# Patient Record
Sex: MALE | Race: WHITE | NOT HISPANIC OR LATINO | URBAN - NONMETROPOLITAN AREA
[De-identification: names, ages, dates, MRNs, and addresses within clinical notes are randomized per-mention and may not be internally consistent; named-entity substitution may affect disease eponyms.]

---

## 2018-05-22 ENCOUNTER — OFFICE VISIT CONVERTED (OUTPATIENT)
Dept: FAMILY MEDICINE CLINIC | Age: 43
End: 2018-05-22
Attending: NURSE PRACTITIONER

## 2018-09-04 ENCOUNTER — OFFICE VISIT CONVERTED (OUTPATIENT)
Dept: FAMILY MEDICINE CLINIC | Age: 43
End: 2018-09-04
Attending: NURSE PRACTITIONER

## 2019-03-21 ENCOUNTER — OFFICE VISIT CONVERTED (OUTPATIENT)
Dept: FAMILY MEDICINE CLINIC | Age: 44
End: 2019-03-21
Attending: NURSE PRACTITIONER

## 2021-05-18 NOTE — PROGRESS NOTES
Greg Peña 1975     Office/Outpatient Visit    Visit Date: Tue, May 22, 2018 03:19 pm    Provider: Kinsey Foster N.P. (Assistant: Kendra Austin MA)    Location: [facility]        Electronically signed by Kinsey Foster N.P. on  05/23/2018 01:02:25 AM                             SUBJECTIVE:        CC:     Mr. Peña is a 42 year old White male.  Med refill, Hep A shot, colonscopy         HPI:     wants Hep A shot         Concerning mixed hyperlipidemia, date of diagnosis 2016.  Current treatment includes Zocor.  Compliance with treatment has been good; he takes his medication as directed, maintains his low cholesterol diet, follows up as directed, and maintains his exercise regimen.  Average lipid levels WITH medication have been reported as total cholesterol 199, LDL 130_, HDL 5.5, and triglycerides 163.  Most recent lab tests include total cholesterol level ( fasting ) and triglycerides ( fasting ).      ROS:     CONSTITUTIONAL:  Positive for wants Hep vaccine.   Negative for chills, fatigue, fever or weight change.      CARDIOVASCULAR:  Positive for High chol. taking Simvastatin with some improvement , has been off of it about 2 weeks ran out of med.   Negative for chest pain, orthopnea, paroxysmal nocturnal dyspnea or pedal edema.      RESPIRATORY:  Negative for dyspnea and cough.      GASTROINTESTINAL:  Negative for abdominal pain, heartburn, constipation, diarrhea, and stool changes.          PMH/FMH/SH:     Last Reviewed on 5/23/2018 01:00 AM by Kinsey Foster    Past Medical History:             PAST MEDICAL HISTORY         Hyperlipidemia         Family History:     Father: Hyperlipidemia     Mother: Healthy     Paternal Grandfather: Hyperlipidemia     Paternal Grandmother: Hyperlipidemia         Social History:         Employment: Position: .      Marital Status:          Tobacco/Alcohol/Supplements:     Last Reviewed on  5/23/2018 01:00 AM by Kinsey Foster    Tobacco: He has never smoked.          Alcohol: Frequency: Just on weekends         Communicable Diseases (eg. STDs):     Reviewed.             Current Problems:     Last Reviewed on 5/23/2018 01:00 AM by Kinsey Foster    Mixed hyperlipidemia         Immunizations:     Hep A, adult dose 5/22/2018         Allergies:     Last Reviewed on 5/23/2018 01:00 AM by Kinsey Foster    Poison katelynn:        Current Medications:     Last Reviewed on 5/23/2018 01:00 AM by Kinsey Foster    Simvastatin 20mg Tablet 1 tab daily         OBJECTIVE:        Vitals:         Current: 5/22/2018 3:22:23 PM    Ht:  5 ft, 9 in;  Wt: 174.5 lbs;  BMI: 25.8    T: 98.8 F (oral);  BP: 130/86 mm Hg (left arm, sitting);  P: 86 bpm (left arm (BP Cuff), sitting);  sCr: 1.17 mg/dL;  GFR: 78.97        Exams:     PHYSICAL EXAM:     GENERAL: Vitals recorded well developed, well nourished;  well groomed;  no apparent distress;     RESPIRATORY: normal respiratory rate and pattern with no distress; normal breath sounds with no rales, rhonchi, wheezes or rubs;     CARDIOVASCULAR: normal rate; rhythm is regular;  normal S1; normal S2; no systolic murmur; no cyanosis; no edema;     NEUROLOGICAL:  cranial nerves, motor and sensory function, reflexes, gait and coordination are all intact;     PSYCHIATRIC:  appropriate affect and demeanor; normal speech pattern; grossly normal memory;         Procedures:     Vaccination against viral hepatitis     1. Hepatitis A (adult): 1.0 ml given IM in the right upper arm; administered by AW;  lot number ; expires 11/21/2020             ASSESSMENT:           V05.3   Z23  Vaccination against viral hepatitis              DDx:     272.2   E78.2  Mixed hyperlipidemia              DDx:         ORDERS:         Meds Prescribed:       Refill of: Simvastatin 20mg Tablet 1 tab daily  #90 (Ninety) tablet(s) Refills: 3         Lab Orders:       APPTO  Appointment need  (In-House)            Procedures Ordered:       09639  Immunization administration; one vaccine  (In-House)         15457  HepA vaccine adult dose for intramuscular use  (In-House)                   PLAN:          Vaccination against viral hepatitis 2nd dose in 6mo dmt           Orders:       68403  Immunization administration; one vaccine  (In-House)         49938  HepA vaccine adult dose for intramuscular use  (In-House)             Patient Education Handouts:       Hepatitis A           Mixed hyperlipidemia         RECOMMENDATIONS given include: Low chol diet.      FOLLOW-UP: Schedule follow-up appointments on a p.r.n. basis. in 1 year.            Prescriptions:       Refill of: Simvastatin 20mg Tablet 1 tab daily  #90 (Ninety) tablet(s) Refills: 3           Orders:       APPTO  Appointment need  (In-House)               Patient Recommendations:        For  Mixed hyperlipidemia:     Schedule follow-up appointments as needed.                APPOINTMENT INFORMATION:        Monday Tuesday Wednesday Thursday Friday Saturday Sunday            Time:___________________AM  PM   Date:_____________________             CHARGE CAPTURE:           Primary Diagnosis:     V05.3 Vaccination against viral hepatitis            Z23    Encounter for immunization              Orders:          11843   Office/outpatient visit; established patient, level 4  (In-House)             78647   Immunization administration; one vaccine  (In-House)             51364   HepA vaccine adult dose for intramuscular use  (In-House)           272.2 Mixed hyperlipidemia            E78.2    Mixed hyperlipidemia              Orders:          APPTO   Appointment need  (In-House)

## 2021-05-18 NOTE — PROGRESS NOTES
Greg Peña 1975     Office/Outpatient Visit    Visit Date: Thu, Mar 21, 2019 12:15 pm    Provider: Kinsey Foster N.P. (Assistant: Maia Luna LPN)    Location: [facility]        Electronically signed by Kinsey Foster N.P. on  03/21/2019 03:02:55 PM                             SUBJECTIVE:        CC:     Mr. Peña is a 43 year old White male.  He presents with cold symptoms.          HPI:         Patient to be evaluated for acute upper respiratory infection.  These have been present for the past 5 days.  The symptoms include body aches, chest congestion, Chills, productive cough and nasal congestion.  He reports recent exposure to illness from co-workers.  He has already tried to relieve the symptoms with Mucinex.      ROS:     CONSTITUTIONAL:  Positive for chills.   Negative for fever.      E/N/T:  Positive for nasal congestion, hoarseness, sinus pressure and sore throat.   Negative for ear pain or diminished hearing.      CARDIOVASCULAR:  Negative for chest pain, palpitations, tachycardia, orthopnea, and edema.      RESPIRATORY:  Positive for recent cough ( with scant amounts of purulent sputum ).   Negative for dyspnea.      GASTROINTESTINAL:  Positive for Hx left inj hernia in the past , had it repaired with Mesh , has started hurting again, denies fever or trouble with urination or BMs. or erection but wants to see surgeon he saw before to have it looked at.      NEUROLOGICAL:  Negative for dizziness, memory loss, paresthesias, tremor and weakness.      PSYCHIATRIC:  Negative for anxiety, depression, and sleep disturbances.          PMH/FMH/SH:     Last Reviewed on 3/21/2019 12:34 PM by Kinsey Foster    Past Medical History:             PAST MEDICAL HISTORY         Hyperlipidemia         Surgical History:         Hernia Repair: bilateral inguinal;         Family History:     Father: Hyperlipidemia     Mother: Healthy     Paternal Grandfather: Hyperlipidemia      Paternal Grandmother: Hyperlipidemia         Social History:         Employment: Position: .      Marital Status:          Tobacco/Alcohol/Supplements:     Last Reviewed on 3/21/2019 12:34 PM by Kinsey Foster    Tobacco: He has never smoked.          Alcohol: Frequency: Just on weekends             Current Problems:     Last Reviewed on 3/21/2019 12:34 PM by Kinsey Foster    Mixed hyperlipidemia     Acute upper respiratory infection         Immunizations:     Hep A, adult dose 5/22/2018         Allergies:     Last Reviewed on 3/21/2019 12:34 PM by Kinsey Foster    Poison katelynn:        Current Medications:     Last Reviewed on 3/21/2019 12:34 PM by Kinsey Foster    Simvastatin 20mg Tablet 1 tab daily         OBJECTIVE:        Vitals:         Current: 3/21/2019 12:19:34 PM    Ht:  5 ft, 9 in;  Wt: 190.4 lbs;  BMI: 28.1    T: 98.5 F (oral);  BP: 141/78 mm Hg (left arm, sitting);  P: 107 bpm (left arm (BP Cuff), sitting);  sCr: 1.17 mg/dL;  GFR: 81.15        Repeat:     12:39:21 PM     P:   96bpm (apical, sitting, regular)         Exams:     PHYSICAL EXAM:     GENERAL: Vitals recorded well developed, well nourished;  well groomed;  no apparent distress;     E/N/T: EARS: external auditory canal normal;  both TMs are dull;  NOSE: bilateral maxillary sinus tenderness present;     RESPIRATORY: normal respiratory rate and pattern with no distress; normal breath sounds with no rales, rhonchi, wheezes or rubs;     CARDIOVASCULAR: normal rate; rhythm is regular;  no systolic murmur; no edema;     GASTROINTESTINAL: left inj area tenderness no mass hernia present;     LYMPHATIC: no enlargement of cervical or facial nodes;     NEUROLOGIC: CN 2-12, motor and sensory function, reflexes, gait and coordination are all intact; GROSSLY INTACT     PSYCHIATRIC:  appropriate affect and demeanor; normal speech pattern; grossly normal memory;         Lab/Test Results:             Influenza  A and B:  Negative (03/21/2019),     Performed by::  [facility] (03/21/2019),     Rapid Strep Screen:  Negative (03/21/2019),             ASSESSMENT           461.8   J01.00  Acute sinusitis, other              DDx:     550.90   K40.90  Uncomplicated inguinal hernia, unilateral              DDx:         ORDERS:         Meds Prescribed:       Augmentin (Amoxicillin/Clavulanate) 875mg/125mg Tablet 1 po bid with food  #20 (Twenty) tablet(s) Refills: 0       Fluticasone Propionate 50mcg/1actuation Nasal Spray 1 spray each nostil daily  #1 (One) gm Refills: 2       Guaifenesin 200mg Tablet take 2 tablets twice daily  #40 (Forty) tablet(s) Refills: 0         Lab Orders:       [id number]  Infectious agent antigen detection by immunoassay; Influenza  (In-House)         03227  Group A Streptococcus detection by immunoassay with direct optical observation  (In-House)         94394  Infectious agent antigen detection by immunoassay; Influenza  (In-House)           Procedures Ordered:       REFER  Referral to Specialist or Other Facility  (Send-Out)                   PLAN:          Acute sinusitis, other         RECOMMENDATIONS given include: rest and increase oral fluid intake.      FOLLOW-UP: Advised to call if there is no improvement in 3-4 day(s).  School/Work Excuse for Today           Prescriptions:       Augmentin (Amoxicillin/Clavulanate) 875mg/125mg Tablet 1 po bid with food  #20 (Twenty) tablet(s) Refills: 0       Fluticasone Propionate 50mcg/1actuation Nasal Spray 1 spray each nostil daily  #1 (One) gm Refills: 2       Guaifenesin 200mg Tablet take 2 tablets twice daily  #40 (Forty) tablet(s) Refills: 0           Orders:       12966-09  Infectious agent antigen detection by immunoassay; Influenza  (In-House)         17712  Group A Streptococcus detection by immunoassay with direct optical observation  (In-House)         91132  Infectious agent antigen detection by immunoassay; Influenza  (In-House)            Uncomplicated  inguinal hernia, unilateral         REFERRALS:  Referral initiated to a general surgeon ( Dr. Francisco Connors; for evaluation of recurrent left Ing hernia ).            Orders:       REFER  Referral to Specialist or Other Facility  (Send-Out)               CHARGE CAPTURE           **Please note: ICD descriptions below are intended for billing purposes only and may not represent clinical diagnoses**        Primary Diagnosis:         461.8 Acute sinusitis, other            J01.00    Acute maxillary sinusitis, unspecified              Orders:          65377   Office/outpatient visit; established patient, level 4  (In-House)             99599 -59  Infectious agent antigen detection by immunoassay; Influenza  (In-House)             92914   Group A Streptococcus detection by immunoassay with direct optical observation  (In-House)             36264   Infectious agent antigen detection by immunoassay; Influenza  (In-House)           550.90 Uncomplicated inguinal hernia, unilateral            K40.90    Unilateral inguinal hernia, without obstruction or gangrene, not specified as recurrent

## 2021-05-18 NOTE — PROGRESS NOTES
Greg Peña 1975     Office/Outpatient Visit    Visit Date: Tue, Sep 4, 2018 04:33 pm    Provider: Anila Duque N.P. (Assistant: Whit Morris LPN)    Location: [facility]        Electronically signed by Anila Duque N.P. on  09/06/2018 09:15:47 AM                             SUBJECTIVE:        CC:     Mr. Peña is a 42 year old White male.  He presents with cold symptoms.          HPI:         Patient to be evaluated for acute upper respiratory infection.  These have been present for the past 10 days.  The symptoms include ear complaints, headache, nasal congestion, nasal discharge and sinus pain/pressure.  He denies exposure to ill contacts.  He has not tried any medications for symptomatic relief.      ROS:     CONSTITUTIONAL:  Negative for chills and fever.      E/N/T:  Positive for ear pain, nasal congestion and sinus pressure.      CARDIOVASCULAR:  Negative for chest pain and pedal edema.      RESPIRATORY:  Negative for recent cough and dyspnea.      GASTROINTESTINAL:  Negative for abdominal pain, constipation, diarrhea, heartburn, nausea and vomiting.      GENITOURINARY:  Negative for dysuria and change in urine stream.      MUSCULOSKELETAL:  Negative for arthralgias and myalgias.      INTEGUMENTARY:  Negative for pruritis and rash.      NEUROLOGICAL:  Negative for dizziness, fainting, headaches and paresthesias.      ENDOCRINE:  Negative for hair loss, polydipsia and polyphagia.      ALLERGIC/IMMUNOLOGIC:  Negative for seasonal allergies.      PSYCHIATRIC:  Negative for anxiety, depression and suicidal thoughts.          PMH/FMH/SH:     Last Reviewed on 5/23/2018 01:00 AM by Kinsey Foster    Past Medical History:             PAST MEDICAL HISTORY         Hyperlipidemia         Family History:     Father: Hyperlipidemia     Mother: Healthy     Paternal Grandfather: Hyperlipidemia     Paternal Grandmother: Hyperlipidemia         Social History:         Employment:  Position: .      Marital Status:          Tobacco/Alcohol/Supplements:     Last Reviewed on 5/23/2018 01:00 AM by Kinsey Foster    Tobacco: He has never smoked.          Alcohol: Frequency: Just on weekends         Substance Abuse History:     Last Reviewed on 5/23/2018 01:00 AM by Kinsey Foster        Mental Health History:     Last Reviewed on 5/23/2018 01:00 AM by Kinsey Foster        Communicable Diseases (eg STDs):     Last Reviewed on 5/23/2018 01:00 AM by Kinsey Foster            Current Problems:     Last Reviewed on 5/23/2018 01:00 AM by Kinsey Foster    Mixed hyperlipidemia         Immunizations:     Hep A, adult dose 5/22/2018         Allergies:     Last Reviewed on 5/23/2018 01:00 AM by Kinsey Foster    Poison katelynn:        Current Medications:     Last Reviewed on 5/23/2018 01:00 AM by Kinsey Foster    Simvastatin 20mg Tablet 1 tab daily         OBJECTIVE:        Vitals:         Current: 9/4/2018 4:35:25 PM    Ht:  5 ft, 9 in;  Wt: 180.2 lbs;  BMI: 26.6    T: 98.9 F (oral);  BP: 131/81 mm Hg (left arm, sitting);  P: 90 bpm (left arm (BP Cuff), sitting);  sCr: 1.17 mg/dL;  GFR: 80.06        Exams:     PHYSICAL EXAM:     GENERAL: Vitals recorded well developed, well nourished;  no apparent distress;     E/N/T: EARS: external auditory canal normal bilaterally and erythematous;  both TMs are dull;  NOSE: bilateral maxillary sinus tenderness present; OROPHARYNX:  normal mucosa, dentition, gingiva, and posterior pharynx;     NECK: range of motion is normal;     RESPIRATORY: normal appearance and symmetric expansion of chest wall; normal respiratory rate and pattern with no distress; normal breath sounds with no rales, rhonchi, wheezes or rubs;     CARDIOVASCULAR: normal rate; rhythm is regular;  no edema;     LYMPHATIC: no enlargement of cervical or facial nodes; no supraclavicular nodes;     MUSCULOSKELETAL: normal gait; normal range of  motion of all major muscle groups; no limb or joint pain with range of motion;     NEUROLOGIC: mental status: alert and oriented x 3;     PSYCHIATRIC: appropriate affect and demeanor; normal speech pattern; normal thought and perception;         Lab/Test Results:             Rapid Strep Screen:  Negative (09/04/2018),     Performed by::  [facility] (09/04/2018),             ASSESSMENT           465.8   J06.9   J01.00  Acute upper respiratory infection              DDx:         ORDERS:         Meds Prescribed:       Augmentin (Amoxicillin/Clavulanate) 875mg/125mg Tablet 1 TAB BID times 5 days  #10 (Ten) tablet(s) Refills: 0       Cetirizine HCl 10mg Tablet 1 tab daily  #30 (Thirty) tablet(s) Refills: 0         Lab Orders:       [id number]  Group A Streptococcus detection by immunoassay with direct optical observation  (In-House)         12389  [facility] Throat culture, strep  (Send-Out)                   PLAN:          Acute upper respiratory infection           Prescriptions:       Augmentin (Amoxicillin/Clavulanate) 875mg/125mg Tablet 1 TAB BID times 5 days  #10 (Ten) tablet(s) Refills: 0       Cetirizine HCl 10mg Tablet 1 tab daily  #30 (Thirty) tablet(s) Refills: 0           Orders:       [id number]  Group A Streptococcus detection by immunoassay with direct optical observation  (In-House)         90871  [facility] Throat culture, strep  (Send-Out)               CHARGE CAPTURE           **Please note: ICD descriptions below are intended for billing purposes only and may not represent clinical diagnoses**        Primary Diagnosis:         465.8 Acute upper respiratory infection            J06.9    Acute upper respiratory infection, unspecified           J01.00    Acute maxillary sinusitis, unspecified              Orders:          [id number]   Office/outpatient visit; established patient, level 3  (In-House)             13003   Group A Streptococcus detection by immunoassay with direct optical observation  (In-House)

## 2021-07-01 VITALS
WEIGHT: 180.2 LBS | BODY MASS INDEX: 26.69 KG/M2 | SYSTOLIC BLOOD PRESSURE: 131 MMHG | HEIGHT: 69 IN | DIASTOLIC BLOOD PRESSURE: 81 MMHG | HEART RATE: 90 BPM | TEMPERATURE: 98.9 F

## 2021-07-01 VITALS
SYSTOLIC BLOOD PRESSURE: 130 MMHG | DIASTOLIC BLOOD PRESSURE: 86 MMHG | HEART RATE: 86 BPM | BODY MASS INDEX: 25.84 KG/M2 | TEMPERATURE: 98.8 F | WEIGHT: 174.5 LBS | HEIGHT: 69 IN

## 2021-07-01 VITALS
BODY MASS INDEX: 28.2 KG/M2 | DIASTOLIC BLOOD PRESSURE: 78 MMHG | TEMPERATURE: 98.5 F | HEIGHT: 69 IN | HEART RATE: 96 BPM | SYSTOLIC BLOOD PRESSURE: 141 MMHG | WEIGHT: 190.4 LBS

## 2021-07-09 ENCOUNTER — OFFICE VISIT (OUTPATIENT)
Dept: FAMILY MEDICINE CLINIC | Age: 46
End: 2021-07-09

## 2021-07-09 VITALS
HEIGHT: 69 IN | BODY MASS INDEX: 29.3 KG/M2 | TEMPERATURE: 98.6 F | SYSTOLIC BLOOD PRESSURE: 124 MMHG | DIASTOLIC BLOOD PRESSURE: 73 MMHG | WEIGHT: 197.8 LBS | HEART RATE: 90 BPM

## 2021-07-09 DIAGNOSIS — Z11.59 SCREENING FOR VIRAL DISEASE: ICD-10-CM

## 2021-07-09 DIAGNOSIS — Z13.6 SCREENING FOR CARDIOVASCULAR CONDITION: ICD-10-CM

## 2021-07-09 DIAGNOSIS — E78.2 MIXED HYPERLIPIDEMIA: Primary | ICD-10-CM

## 2021-07-09 PROCEDURE — 99214 OFFICE O/P EST MOD 30 MIN: CPT | Performed by: NURSE PRACTITIONER

## 2021-07-09 RX ORDER — SIMVASTATIN 20 MG
20 TABLET ORAL NIGHTLY
Qty: 90 TABLET | Refills: 0 | Status: SHIPPED | OUTPATIENT
Start: 2021-07-09 | End: 2021-10-12 | Stop reason: SDUPTHER

## 2021-07-09 NOTE — PROGRESS NOTES
"Chief Complaint  Hyperlipidemia    Subjective          Greg Peña presents to [facility]  Has been previously on medication for high cholesterol.  Since covid hit, did not follow up .  Has been off for about 1 year or more  Has gained weight No recent labs       Review of Systems   Constitutional: Negative for fatigue and fever.   Respiratory: Negative for shortness of breath.    Cardiovascular: Negative for chest pain and leg swelling.   Psychiatric/Behavioral: Negative for dysphoric mood. The patient is not nervous/anxious.          Health Maintenance Due   Topic Date Due   • ANNUAL PHYSICAL  Never done   • TDAP/TD VACCINES (1 - Tdap) Never done   • HEPATITIS C SCREENING  Never done        Objective     Vital Signs:   /73 (BP Location: Left arm, Patient Position: Sitting)   Pulse 90   Temp 98.6 °F (37 °C) (Oral)   Ht 175.3 cm (69\")   Wt 89.7 kg (197 lb 12.8 oz)   BMI 29.21 kg/m²       Physical Exam  Vitals reviewed.   Constitutional:       Appearance: Normal appearance.   HENT:      Head: Normocephalic.   Eyes:      Pupils: Pupils are equal, round, and reactive to light.   Cardiovascular:      Rate and Rhythm: Normal rate and regular rhythm.      Heart sounds: No murmur heard.     Pulmonary:      Effort: Pulmonary effort is normal.      Breath sounds: Normal breath sounds.   Musculoskeletal:         General: Normal range of motion.   Neurological:      Mental Status: He is alert.   Psychiatric:         Mood and Affect: Mood normal.         Behavior: Behavior normal.          Result Review :                      Assessment and Plan    Diagnoses and all orders for this visit:    1. Mixed hyperlipidemia (Primary)  Comments:  will restart medicatin and plan to repeat labs in 3 months    Assessment & Plan:  Lipid abnormalities are unchanged.  Nutritional counseling was provided.  Lipids will be reassessed in 6 months.    Orders:  -     simvastatin (Zocor) 20 MG tablet; " Take 1 tablet by mouth Every Night for 90 days.  Dispense: 90 tablet; Refill: 0    2. Screening for cardiovascular condition  Comments:  labs pending  Orders:  -     Comprehensive Metabolic Panel; Future  -     CBC & Differential; Future  -     TSH; Future  -     Lipid Panel; Future    3. Screening for viral disease  -     Hepatitis C antibody; Future          Follow Up    Return in about 3 months (around 10/9/2021) for Annual physical, labs.      Patient was given instructions and counseling regarding his condition or for health maintenance advice. Please see specific information pulled into the AVS if appropriate.

## 2021-07-09 NOTE — PATIENT INSTRUCTIONS
"https://www.nhlbi.nih.gov/files/docs/public/heart/dash_brief.pdf\">   DASH Eating Plan  DASH stands for Dietary Approaches to Stop Hypertension. The DASH eating plan is a healthy eating plan that has been shown to:  · Reduce high blood pressure (hypertension).  · Reduce your risk for type 2 diabetes, heart disease, and stroke.  · Help with weight loss.  What are tips for following this plan?  Reading food labels  · Check food labels for the amount of salt (sodium) per serving. Choose foods with less than 5 percent of the Daily Value of sodium. Generally, foods with less than 300 milligrams (mg) of sodium per serving fit into this eating plan.  · To find whole grains, look for the word \"whole\" as the first word in the ingredient list.  Shopping  · Buy products labeled as \"low-sodium\" or \"no salt added.\"  · Buy fresh foods. Avoid canned foods and pre-made or frozen meals.  Cooking  · Avoid adding salt when cooking. Use salt-free seasonings or herbs instead of table salt or sea salt. Check with your health care provider or pharmacist before using salt substitutes.  · Do not heredia foods. Cook foods using healthy methods such as baking, boiling, grilling, roasting, and broiling instead.  · Cook with heart-healthy oils, such as olive, canola, avocado, soybean, or sunflower oil.  Meal planning    · Eat a balanced diet that includes:  ? 4 or more servings of fruits and 4 or more servings of vegetables each day. Try to fill one-half of your plate with fruits and vegetables.  ? 6-8 servings of whole grains each day.  ? Less than 6 oz (170 g) of lean meat, poultry, or fish each day. A 3-oz (85-g) serving of meat is about the same size as a deck of cards. One egg equals 1 oz (28 g).  ? 2-3 servings of low-fat dairy each day. One serving is 1 cup (237 mL).  ? 1 serving of nuts, seeds, or beans 5 times each week.  ? 2-3 servings of heart-healthy fats. Healthy fats called omega-3 fatty acids are found in foods such as walnuts, " flaxseeds, fortified milks, and eggs. These fats are also found in cold-water fish, such as sardines, salmon, and mackerel.  · Limit how much you eat of:  ? Canned or prepackaged foods.  ? Food that is high in trans fat, such as some fried foods.  ? Food that is high in saturated fat, such as fatty meat.  ? Desserts and other sweets, sugary drinks, and other foods with added sugar.  ? Full-fat dairy products.  · Do not salt foods before eating.  · Do not eat more than 4 egg yolks a week.  · Try to eat at least 2 vegetarian meals a week.  · Eat more home-cooked food and less restaurant, buffet, and fast food.  Lifestyle  · When eating at a restaurant, ask that your food be prepared with less salt or no salt, if possible.  · If you drink alcohol:  ? Limit how much you use to:  § 0-1 drink a day for women who are not pregnant.  § 0-2 drinks a day for men.  ? Be aware of how much alcohol is in your drink. In the [place] one drink equals one 12 oz bottle of beer (355 mL), one 5 oz glass of wine (148 mL), or one 1½ oz glass of hard liquor (44 mL).  General information  · Avoid eating more than 2,300 mg of salt a day. If you have hypertension, you may need to reduce your sodium intake to 1,500 mg a day.  · Work with your health care provider to maintain a healthy body weight or to lose weight. Ask what an ideal weight is for you.  · Get at least 30 minutes of exercise that causes your heart to beat faster (aerobic exercise) most days of the week. Activities may include walking, swimming, or biking.  · Work with your health care provider or dietitian to adjust your eating plan to your individual calorie needs.  What foods should I eat?  Fruits  All fresh, dried, or frozen fruit. Canned fruit in natural juice (without added sugar).  Vegetables  Fresh or frozen vegetables (raw, steamed, roasted, or grilled). Low-sodium or reduced-sodium tomato and vegetable juice. Low-sodium or reduced-sodium tomato sauce and tomato paste.  Low-sodium or reduced-sodium canned vegetables.  Grains  Whole-grain or whole-wheat bread. Whole-grain or whole-wheat pasta. Brown rice. Oatmeal. Quinoa. Bulgur. Whole-grain and low-sodium cereals. Dulce bread. Low-fat, low-sodium crackers. Whole-wheat flour tortillas.  Meats and other proteins  Skinless chicken or turkey. Ground chicken or turkey. Pork with fat trimmed off. Fish and seafood. Egg whites. Dried beans, peas, or lentils. Unsalted nuts, nut butters, and seeds. Unsalted canned beans. Lean cuts of beef with fat trimmed off. Low-sodium, lean precooked or cured meat, such as sausages or meat loaves.  Dairy  Low-fat (1%) or fat-free (skim) milk. Reduced-fat, low-fat, or fat-free cheeses. Nonfat, low-sodium ricotta or cottage cheese. Low-fat or nonfat yogurt. Low-fat, low-sodium cheese.  Fats and oils  Soft margarine without trans fats. Vegetable oil. Reduced-fat, low-fat, or light mayonnaise and salad dressings (reduced-sodium). Canola, safflower, olive, avocado, soybean, and sunflower oils. Avocado.  Seasonings and condiments  Herbs. Spices. Seasoning mixes without salt.  Other foods  Unsalted popcorn and pretzels. Fat-free sweets.  The items listed above may not be a complete list of foods and beverages you can eat. Contact a dietitian for more information.  What foods should I avoid?  Fruits  Canned fruit in a light or heavy syrup. Fried fruit. Fruit in cream or butter sauce.  Vegetables  Creamed or fried vegetables. Vegetables in a cheese sauce. Regular canned vegetables (not low-sodium or reduced-sodium). Regular canned tomato sauce and paste (not low-sodium or reduced-sodium). Regular tomato and vegetable juice (not low-sodium or reduced-sodium). Pickles. Olives.  Grains  Baked goods made with fat, such as croissants, muffins, or some breads. Dry pasta or rice meal packs.  Meats and other proteins  Fatty cuts of meat. Ribs. Fried meat. Grant. Bologna, salami, and other precooked or cured meats, such as  sausages or meat loaves. Fat from the back of a pig (fatback). Bratwurst. Salted nuts and seeds. Canned beans with added salt. Canned or smoked fish. Whole eggs or egg yolks. Chicken or turkey with skin.  Dairy  Whole or 2% milk, cream, and half-and-half. Whole or full-fat cream cheese. Whole-fat or sweetened yogurt. Full-fat cheese. Nondairy creamers. Whipped toppings. Processed cheese and cheese spreads.  Fats and oils  Butter. Stick margarine. Lard. Shortening. Ghee. Grant fat. Tropical oils, such as coconut, palm kernel, or palm oil.  Seasonings and condiments  Onion salt, garlic salt, seasoned salt, table salt, and sea salt. Worcestershire sauce. Tartar sauce. Barbecue sauce. Teriyaki sauce. Soy sauce, including reduced-sodium. Steak sauce. Canned and packaged gravies. Fish sauce. Oyster sauce. Cocktail sauce. Store-bought horseradish. Ketchup. Mustard. Meat flavorings and tenderizers. Bouillon cubes. Hot sauces. Pre-made or packaged marinades. Pre-made or packaged taco seasonings. Relishes. Regular salad dressings.  Other foods  Salted popcorn and pretzels.  The items listed above may not be a complete list of foods and beverages you should avoid. Contact a dietitian for more information.  Where to find more information  · National Heart, Lung, and Blood [place]: www.nhlbi.nih.gov  · American Heart Association: www.heart.org  · Academy of Nutrition and Dietetics: www.eatright.org  · National Kidney Foundation: www.kidney.org  Summary  · The DASH eating plan is a healthy eating plan that has been shown to reduce high blood pressure (hypertension). It may also reduce your risk for type 2 diabetes, heart disease, and stroke.  · When on the DASH eating plan, aim to eat more fresh fruits and vegetables, whole grains, lean proteins, low-fat dairy, and heart-healthy fats.  · With the DASH eating plan, you should limit salt (sodium) intake to 2,300 mg a day. If you have hypertension, you may need to reduce your  "sodium intake to 1,500 mg a day.  · Work with your health care provider or dietitian to adjust your eating plan to your individual calorie needs.  This information is not intended to replace advice given to you by your health care provider. Make sure you discuss any questions you have with your health care provider.  Document Revised: 11/20/2020 Document Reviewed: 11/20/2020  [employer] Patient Education © 2021 [employer]      https://www.nhlbi.nih.gov/files/docs/public/heart/dash_brief.pdf\">   DASH Eating Plan  DASH stands for Dietary Approaches to Stop Hypertension. The DASH eating plan is a healthy eating plan that has been shown to:  · Reduce high blood pressure (hypertension).  · Reduce your risk for type 2 diabetes, heart disease, and stroke.  · Help with weight loss.  What are tips for following this plan?  Reading food labels  · Check food labels for the amount of salt (sodium) per serving. Choose foods with less than 5 percent of the Daily Value of sodium. Generally, foods with less than 300 milligrams (mg) of sodium per serving fit into this eating plan.  · To find whole grains, look for the word \"whole\" as the first word in the ingredient list.  Shopping  · Buy products labeled as \"low-sodium\" or \"no salt added.\"  · Buy fresh foods. Avoid canned foods and pre-made or frozen meals.  Cooking  · Avoid adding salt when cooking. Use salt-free seasonings or herbs instead of table salt or sea salt. Check with your health care provider or pharmacist before using salt substitutes.  · Do not heredia foods. Cook foods using healthy methods such as baking, boiling, grilling, roasting, and broiling instead.  · Cook with heart-healthy oils, such as olive, canola, avocado, soybean, or sunflower oil.  Meal planning    · Eat a balanced diet that includes:  ? 4 or more servings of fruits and 4 or more servings of vegetables each day. Try to fill one-half of your plate with fruits and vegetables.  ? 6-8 servings of whole " grains each day.  ? Less than 6 oz (170 g) of lean meat, poultry, or fish each day. A 3-oz (85-g) serving of meat is about the same size as a deck of cards. One egg equals 1 oz (28 g).  ? 2-3 servings of low-fat dairy each day. One serving is 1 cup (237 mL).  ? 1 serving of nuts, seeds, or beans 5 times each week.  ? 2-3 servings of heart-healthy fats. Healthy fats called omega-3 fatty acids are found in foods such as walnuts, flaxseeds, fortified milks, and eggs. These fats are also found in cold-water fish, such as sardines, salmon, and mackerel.  · Limit how much you eat of:  ? Canned or prepackaged foods.  ? Food that is high in trans fat, such as some fried foods.  ? Food that is high in saturated fat, such as fatty meat.  ? Desserts and other sweets, sugary drinks, and other foods with added sugar.  ? Full-fat dairy products.  · Do not salt foods before eating.  · Do not eat more than 4 egg yolks a week.  · Try to eat at least 2 vegetarian meals a week.  · Eat more home-cooked food and less restaurant, buffet, and fast food.  Lifestyle  · When eating at a restaurant, ask that your food be prepared with less salt or no salt, if possible.  · If you drink alcohol:  ? Limit how much you use to:  § 0-1 drink a day for women who are not pregnant.  § 0-2 drinks a day for men.  ? Be aware of how much alcohol is in your drink. In the [place] one drink equals one 12 oz bottle of beer (355 mL), one 5 oz glass of wine (148 mL), or one 1½ oz glass of hard liquor (44 mL).  General information  · Avoid eating more than 2,300 mg of salt a day. If you have hypertension, you may need to reduce your sodium intake to 1,500 mg a day.  · Work with your health care provider to maintain a healthy body weight or to lose weight. Ask what an ideal weight is for you.  · Get at least 30 minutes of exercise that causes your heart to beat faster (aerobic exercise) most days of the week. Activities may include walking, swimming, or  biking.  · Work with your health care provider or dietitian to adjust your eating plan to your individual calorie needs.  What foods should I eat?  Fruits  All fresh, dried, or frozen fruit. Canned fruit in natural juice (without added sugar).  Vegetables  Fresh or frozen vegetables (raw, steamed, roasted, or grilled). Low-sodium or reduced-sodium tomato and vegetable juice. Low-sodium or reduced-sodium tomato sauce and tomato paste. Low-sodium or reduced-sodium canned vegetables.  Grains  Whole-grain or whole-wheat bread. Whole-grain or whole-wheat pasta. Brown rice. Oatmeal. Quinoa. Bulgur. Whole-grain and low-sodium cereals. Dulce bread. Low-fat, low-sodium crackers. Whole-wheat flour tortillas.  Meats and other proteins  Skinless chicken or turkey. Ground chicken or turkey. Pork with fat trimmed off. Fish and seafood. Egg whites. Dried beans, peas, or lentils. Unsalted nuts, nut butters, and seeds. Unsalted canned beans. Lean cuts of beef with fat trimmed off. Low-sodium, lean precooked or cured meat, such as sausages or meat loaves.  Dairy  Low-fat (1%) or fat-free (skim) milk. Reduced-fat, low-fat, or fat-free cheeses. Nonfat, low-sodium ricotta or cottage cheese. Low-fat or nonfat yogurt. Low-fat, low-sodium cheese.  Fats and oils  Soft margarine without trans fats. Vegetable oil. Reduced-fat, low-fat, or light mayonnaise and salad dressings (reduced-sodium). Canola, safflower, olive, avocado, soybean, and sunflower oils. Avocado.  Seasonings and condiments  Herbs. Spices. Seasoning mixes without salt.  Other foods  Unsalted popcorn and pretzels. Fat-free sweets.  The items listed above may not be a complete list of foods and beverages you can eat. Contact a dietitian for more information.  What foods should I avoid?  Fruits  Canned fruit in a light or heavy syrup. Fried fruit. Fruit in cream or butter sauce.  Vegetables  Creamed or fried vegetables. Vegetables in a cheese sauce. Regular canned vegetables (not  low-sodium or reduced-sodium). Regular canned tomato sauce and paste (not low-sodium or reduced-sodium). Regular tomato and vegetable juice (not low-sodium or reduced-sodium). Pickles. Olives.  Grains  Baked goods made with fat, such as croissants, muffins, or some breads. Dry pasta or rice meal packs.  Meats and other proteins  Fatty cuts of meat. Ribs. Fried meat. Grant. Bologna, salami, and other precooked or cured meats, such as sausages or meat loaves. Fat from the back of a pig (fatback). Bratwurst. Salted nuts and seeds. Canned beans with added salt. Canned or smoked fish. Whole eggs or egg yolks. Chicken or turkey with skin.  Dairy  Whole or 2% milk, cream, and half-and-half. Whole or full-fat cream cheese. Whole-fat or sweetened yogurt. Full-fat cheese. Nondairy creamers. Whipped toppings. Processed cheese and cheese spreads.  Fats and oils  Butter. Stick margarine. Lard. Shortening. Ghee. Grant fat. Tropical oils, such as coconut, palm kernel, or palm oil.  Seasonings and condiments  Onion salt, garlic salt, seasoned salt, table salt, and sea salt. Worcestershire sauce. Tartar sauce. Barbecue sauce. Teriyaki sauce. Soy sauce, including reduced-sodium. Steak sauce. Canned and packaged gravies. Fish sauce. Oyster sauce. Cocktail sauce. Store-bought horseradish. Ketchup. Mustard. Meat flavorings and tenderizers. Bouillon cubes. Hot sauces. Pre-made or packaged marinades. Pre-made or packaged taco seasonings. Relishes. Regular salad dressings.  Other foods  Salted popcorn and pretzels.  The items listed above may not be a complete list of foods and beverages you should avoid. Contact a dietitian for more information.  Where to find more information  · National Heart, Lung, and Blood [place]: www.nhlbi.nih.gov  · American Heart Association: www.heart.org  · Academy of Nutrition and Dietetics: www.eatright.org  · National Kidney Foundation: www.kidney.org  Summary  · The DASH eating plan is a healthy eating  plan that has been shown to reduce high blood pressure (hypertension). It may also reduce your risk for type 2 diabetes, heart disease, and stroke.  · When on the DASH eating plan, aim to eat more fresh fruits and vegetables, whole grains, lean proteins, low-fat dairy, and heart-healthy fats.  · With the DASH eating plan, you should limit salt (sodium) intake to 2,300 mg a day. If you have hypertension, you may need to reduce your sodium intake to 1,500 mg a day.  · Work with your health care provider or dietitian to adjust your eating plan to your individual calorie needs.  This information is not intended to replace advice given to you by your health care provider. Make sure you discuss any questions you have with your health care provider.  Document Revised: 11/20/2020 Document Reviewed: 11/20/2020  [employer] Patient Education © 2021 [employer]

## 2021-09-28 ENCOUNTER — LAB (OUTPATIENT)
Dept: LAB | Facility: HOSPITAL | Age: 46
End: 2021-09-28

## 2021-09-28 DIAGNOSIS — Z13.6 SCREENING FOR CARDIOVASCULAR CONDITION: ICD-10-CM

## 2021-09-28 DIAGNOSIS — Z11.59 SCREENING FOR VIRAL DISEASE: ICD-10-CM

## 2021-09-28 DIAGNOSIS — R73.09 ABNORMAL GLUCOSE: ICD-10-CM

## 2021-09-28 LAB
ALBUMIN SERPL-MCNC: 4.7 G/DL (ref 3.5–5.2)
ALBUMIN/GLOB SERPL: 1.7 G/DL
ALP SERPL-CCNC: 91 U/L (ref 39–117)
ALT SERPL W P-5'-P-CCNC: 30 U/L (ref 1–41)
ANION GAP SERPL CALCULATED.3IONS-SCNC: 11.4 MMOL/L (ref 5–15)
AST SERPL-CCNC: 21 U/L (ref 1–40)
BASOPHILS # BLD AUTO: 0.04 10*3/MM3 (ref 0–0.2)
BASOPHILS NFR BLD AUTO: 0.5 % (ref 0–1.5)
BILIRUB SERPL-MCNC: 0.3 MG/DL (ref 0–1.2)
BUN SERPL-MCNC: 10 MG/DL (ref 6–20)
BUN/CREAT SERPL: 10.6 (ref 7–25)
CALCIUM SPEC-SCNC: 9.6 MG/DL (ref 8.6–10.5)
CHLORIDE SERPL-SCNC: 102 MMOL/L (ref 98–107)
CHOLEST SERPL-MCNC: 125 MG/DL (ref 0–200)
CO2 SERPL-SCNC: 22.6 MMOL/L (ref 22–29)
CREAT SERPL-MCNC: 0.94 MG/DL (ref 0.76–1.27)
DEPRECATED RDW RBC AUTO: 38.7 FL (ref 37–54)
EOSINOPHIL # BLD AUTO: 0.05 10*3/MM3 (ref 0–0.4)
EOSINOPHIL NFR BLD AUTO: 0.6 % (ref 0.3–6.2)
ERYTHROCYTE [DISTWIDTH] IN BLOOD BY AUTOMATED COUNT: 12.5 % (ref 12.3–15.4)
GFR SERPL CREATININE-BSD FRML MDRD: 106 ML/MIN/1.73
GFR SERPL CREATININE-BSD FRML MDRD: 88 ML/MIN/1.73
GLOBULIN UR ELPH-MCNC: 2.7 GM/DL
GLUCOSE SERPL-MCNC: 141 MG/DL (ref 65–99)
HCT VFR BLD AUTO: 43.1 % (ref 37.5–51)
HCV AB SER DONR QL: NORMAL
HDLC SERPL-MCNC: 37 MG/DL (ref 40–60)
HGB BLD-MCNC: 14.8 G/DL (ref 13–17.7)
IMM GRANULOCYTES # BLD AUTO: 0.01 10*3/MM3 (ref 0–0.05)
IMM GRANULOCYTES NFR BLD AUTO: 0.1 % (ref 0–0.5)
LDLC SERPL CALC-MCNC: 66 MG/DL (ref 0–100)
LDLC/HDLC SERPL: 1.72 {RATIO}
LYMPHOCYTES # BLD AUTO: 1.73 10*3/MM3 (ref 0.7–3.1)
LYMPHOCYTES NFR BLD AUTO: 21.4 % (ref 19.6–45.3)
MCH RBC QN AUTO: 28.9 PG (ref 26.6–33)
MCHC RBC AUTO-ENTMCNC: 34.3 G/DL (ref 31.5–35.7)
MCV RBC AUTO: 84.2 FL (ref 79–97)
MONOCYTES # BLD AUTO: 0.46 10*3/MM3 (ref 0.1–0.9)
MONOCYTES NFR BLD AUTO: 5.7 % (ref 5–12)
NEUTROPHILS NFR BLD AUTO: 5.79 10*3/MM3 (ref 1.7–7)
NEUTROPHILS NFR BLD AUTO: 71.7 % (ref 42.7–76)
PLATELET # BLD AUTO: 249 10*3/MM3 (ref 140–450)
PMV BLD AUTO: 9.8 FL (ref 6–12)
POTASSIUM SERPL-SCNC: 4 MMOL/L (ref 3.5–5.2)
PROT SERPL-MCNC: 7.4 G/DL (ref 6–8.5)
RBC # BLD AUTO: 5.12 10*6/MM3 (ref 4.14–5.8)
SODIUM SERPL-SCNC: 136 MMOL/L (ref 136–145)
TRIGL SERPL-MCNC: 121 MG/DL (ref 0–150)
TSH SERPL DL<=0.05 MIU/L-ACNC: 1.06 UIU/ML (ref 0.27–4.2)
VLDLC SERPL-MCNC: 22 MG/DL (ref 5–40)
WBC # BLD AUTO: 8.08 10*3/MM3 (ref 3.4–10.8)

## 2021-09-28 PROCEDURE — 80053 COMPREHEN METABOLIC PANEL: CPT

## 2021-09-28 PROCEDURE — 86803 HEPATITIS C AB TEST: CPT

## 2021-09-28 PROCEDURE — 85025 COMPLETE CBC W/AUTO DIFF WBC: CPT

## 2021-09-28 PROCEDURE — 83036 HEMOGLOBIN GLYCOSYLATED A1C: CPT

## 2021-09-28 PROCEDURE — 80061 LIPID PANEL: CPT

## 2021-09-28 PROCEDURE — 84443 ASSAY THYROID STIM HORMONE: CPT

## 2021-09-28 PROCEDURE — 36415 COLL VENOUS BLD VENIPUNCTURE: CPT

## 2021-09-29 DIAGNOSIS — R73.09 ABNORMAL GLUCOSE: Primary | ICD-10-CM

## 2021-09-29 LAB — HBA1C MFR BLD: 4.9 % (ref 4.8–5.6)

## 2021-10-12 ENCOUNTER — OFFICE VISIT (OUTPATIENT)
Dept: FAMILY MEDICINE CLINIC | Age: 46
End: 2021-10-12

## 2021-10-12 VITALS
HEART RATE: 75 BPM | WEIGHT: 176.6 LBS | DIASTOLIC BLOOD PRESSURE: 75 MMHG | HEIGHT: 69 IN | BODY MASS INDEX: 26.16 KG/M2 | SYSTOLIC BLOOD PRESSURE: 130 MMHG

## 2021-10-12 DIAGNOSIS — Z23 NEED FOR INFLUENZA VACCINATION: ICD-10-CM

## 2021-10-12 DIAGNOSIS — E78.2 MIXED HYPERLIPIDEMIA: Primary | Chronic | ICD-10-CM

## 2021-10-12 PROCEDURE — 99213 OFFICE O/P EST LOW 20 MIN: CPT | Performed by: NURSE PRACTITIONER

## 2021-10-12 PROCEDURE — 90471 IMMUNIZATION ADMIN: CPT | Performed by: NURSE PRACTITIONER

## 2021-10-12 PROCEDURE — 90686 IIV4 VACC NO PRSV 0.5 ML IM: CPT | Performed by: NURSE PRACTITIONER

## 2021-10-12 RX ORDER — SIMVASTATIN 20 MG
20 TABLET ORAL NIGHTLY
Qty: 90 TABLET | Refills: 3 | Status: SHIPPED | OUTPATIENT
Start: 2021-10-12 | End: 2022-10-07

## 2021-10-12 NOTE — PATIENT INSTRUCTIONS
"Please make sure to schedule a Wellness visit each year with your primary care provider to discuss health screenings, immunization recommendations, routine lab analysis and general health recommendations.   Most insurance covers an annual wellness visit at no cost to you one time per year.  Please call and schedule yours today!     Fat and Cholesterol Restricted Diet  Getting too much fat and cholesterol in your diet may cause health problems. Following this diet helps keep your fat and cholesterol at normal levels. This can keep you from getting sick.  WHAT TYPES OF FAT SHOULD I CHOOSE?  · Choose monosaturated and polyunsaturated fats. These are found in foods such as olive oil, canola oil, flaxseeds, walnuts, almonds, and seeds.  · Eat more omega-3 fats. Good choices include salmon, mackerel, sardines, tuna, flaxseed oil, and ground flaxseeds.  · Limit saturated fats. These are in animal products such as meats, butter, and cream. They can also be in plant products such as palm oil, palm kernel oil, and coconut oil.  ·   ·   · Avoid foods with partially hydrogenated oils in them. These contain trans fats. Examples of foods that have trans fats are stick margarine, some tub margarines, cookies, crackers, and other baked goods.  WHAT GENERAL GUIDELINES DO I NEED TO FOLLOW?    · Check food labels. Look for the words \"trans fat\" and \"saturated fat.\"  · When preparing a meal:  ¨ Fill half of your plate with vegetables and green salads.  ¨ Fill one fourth of your plate with whole grains. Look for the word \"whole\" as the first word in the ingredient list.  ¨ Fill one fourth of your plate with lean protein foods.  · Limit fruit to two servings a day. Choose fruit instead of juice.  · Eat more foods with soluble fiber. Examples of foods with this type of fiber are apples, broccoli, carrots, beans, peas, and barley. Try to get 20-30 g (grams) of fiber per day.  · Eat more home-cooked foods. Eat less at restaurants and " buffets.  · Limit or avoid alcohol.  · Limit foods high in starch and sugar.  · Limit fried foods.  · Cook foods without frying them. Baking, boiling, grilling, and broiling are all great options.  · Lose weight if you are overweight. Losing even a small amount of weight can help your overall health. It can also help prevent diseases such as diabetes and heart disease.  WHAT FOODS CAN I EAT?  Grains  Whole grains, such as whole wheat or whole grain breads, crackers, cereals, and pasta. Unsweetened oatmeal, bulgur, barley, quinoa, or brown rice. Corn or whole wheat flour tortillas.  Vegetables  Fresh or frozen vegetables (raw, steamed, roasted, or grilled). Green salads.  Fruits  All fresh, canned (in natural juice), or frozen fruits.  Meat and Other Protein Products  Ground beef (85% or leaner), grass-fed beef, or beef trimmed of fat. Skinless chicken or turkey. Ground chicken or turkey. Pork trimmed of fat. All fish and seafood. Eggs. Dried beans, peas, or lentils. Unsalted nuts or seeds. Unsalted canned or dry beans.  Dairy  Low-fat dairy products, such as skim or 1% milk, 2% or reduced-fat cheeses, low-fat ricotta or cottage cheese, or plain low-fat yogurt.  Fats and Oils  Tub margarines without trans fats. Light or reduced-fat mayonnaise and salad dressings. Avocado. Olive, canola, sesame, or safflower oils. Natural peanut or almond butter (choose ones without added sugar and oil).  The items listed above may not be a complete list of recommended foods or beverages. Contact your dietitian for more options.  WHAT FOODS ARE NOT RECOMMENDED?  Grains  White bread. White pasta. White rice. Cornbread. Bagels, pastries, and croissants. Crackers that contain trans fat.  Vegetables  White potatoes. Corn. Creamed or fried vegetables. Vegetables in a cheese sauce.  Fruits  Dried fruits. Canned fruit in light or heavy syrup. Fruit juice.  Meat and Other Protein Products  Fatty cuts of meat. Ribs, chicken wings, arciniega,  sausage, bologna, salami, chitterlings, fatback, hot dogs, bratwurst, and packaged luncheon meats. Liver and organ meats.  Dairy  Whole or 2% milk, cream, half-and-half, and cream cheese. Whole milk cheeses. Whole-fat or sweetened yogurt. Full-fat cheeses. Nondairy creamers and whipped toppings. Processed cheese, cheese spreads, or cheese curds.  Sweets and Desserts  Corn syrup, sugars, honey, and molasses. Candy. Jam and jelly. Syrup. Sweetened cereals. Cookies, pies, cakes, donuts, muffins, and ice cream.  Fats and Oils  Butter, stick margarine, lard, shortening, ghee, or arciniega fat. Coconut, palm kernel, or palm oils.  Beverages  Alcohol. Sweetened drinks (such as sodas, lemonade, and fruit drinks or punches).  The items listed above may not be a complete list of foods and beverages to avoid.   Document Released: 06/18/2013 Document Revised: 08/21/2015 Document Reviewed: 03/19/2015  [employer]® Patient Information ©2015 [employer].

## 2021-10-22 NOTE — PROGRESS NOTES
Chief Complaint  Greg Peña presents to [facility] for Follow-up (wants to dicuss lab work and weight )    Subjective          Greg presents today for follow up on hyperlipidemia.Previous values: Lab Results       Component                Value               Date                       CHOL                     125                 09/28/2021                 TRIG                     121                 09/28/2021                 HDL                      37 (L)              09/28/2021                 LDL                      66                  09/28/2021           ;  Current CVD 10yr risk is The ASCVD Risk score (Jonnathannancy LOWE Jr., et al., 2013) failed to calculate for the following reasons:    The valid total cholesterol range is 130 to 320 mg/dL ;  Greg reports compliant with medication.  No side effects reported from this medication . No new concerns to discuss today.    Would like recommendations for weight loss        Review of Systems   Constitutional: Negative for fatigue and fever.   Respiratory: Negative for shortness of breath.    Cardiovascular: Negative for chest pain.   Psychiatric/Behavioral: Negative for dysphoric mood. The patient is not nervous/anxious.          No Known Allergies   Past Medical History:   Diagnosis Date   • Mixed hyperlipidemia      Current Outpatient Medications   Medication Sig Dispense Refill   • simvastatin (Zocor) 20 MG tablet Take 1 tablet by mouth Every Night for 360 days. 90 tablet 3     No current facility-administered medications for this visit.     Past Surgical History:   Procedure Laterality Date   • HERNIA REPAIR Bilateral     inguinal      Social History     Tobacco Use   • Smoking status: Never Smoker   • Smokeless tobacco: Never Used   Vaping Use   • Vaping Use: Never used   Substance Use Topics   • Alcohol use: Yes     Comment: Just weekends   • Drug use: Not Currently     Family History   Problem Relation Age of Onset   •  "Hyperlipidemia Father    • Hyperlipidemia Paternal Grandmother    • Hyperlipidemia Paternal Grandfather    • Alzheimer's disease Maternal Grandmother    • Lung cancer Maternal Grandfather          @ age 60s  (smoker)   • Colon cancer Other         paternal cousin  Dx@ 48       Health Maintenance Due   Topic Date Due   • ANNUAL PHYSICAL  Never done   • TDAP/TD VACCINES (1 - Tdap) Never done      Immunization History   Administered Date(s) Administered   • COVID-19 (MODERNA) 2021, 2021   • FluLaval/Fluarix/Fluzone >6 10/12/2021   • Hepatitis A 2018        Objective     Vitals:    10/12/21 1647   BP: 130/75   BP Location: Left arm   Patient Position: Sitting   Cuff Size: Adult   Pulse: 75   Weight: 80.1 kg (176 lb 9.6 oz)   Height: 175.3 cm (69\")     Body mass index is 26.08 kg/m².     Physical Exam  Vitals reviewed.   Constitutional:       Appearance: Normal appearance.   HENT:      Head: Normocephalic.   Eyes:      Pupils: Pupils are equal, round, and reactive to light.   Cardiovascular:      Rate and Rhythm: Normal rate and regular rhythm.      Heart sounds: No murmur heard.      Pulmonary:      Effort: Pulmonary effort is normal.      Breath sounds: Normal breath sounds.   Musculoskeletal:         General: Normal range of motion.   Neurological:      Mental Status: He is alert.   Psychiatric:         Mood and Affect: Mood normal.         Behavior: Behavior normal.           Result Review :                               Assessment and Plan      Diagnoses and all orders for this visit:    1. Mixed hyperlipidemia (Primary)  Comments:  will restart medicatin and plan to repeat labs in 3 months  Follow up in 1 year;  weight loss recommend calorie counting  limiting carb intake  Orders:  -     simvastatin (Zocor) 20 MG tablet; Take 1 tablet by mouth Every Night for 360 days.  Dispense: 90 tablet; Refill: 3  -     Lipid panel; Future    2. Need for influenza vaccination  -     " FluLaval/Fluarix/Fluzone >6 Months              Follow Up     Return in about 1 year (around 10/12/2022) for Annual physical, Recheck.

## 2025-04-11 ENCOUNTER — TELEPHONE (OUTPATIENT)
Dept: UROLOGY | Age: 50
End: 2025-04-11
Payer: COMMERCIAL

## 2025-04-11 ENCOUNTER — HOSPITAL ENCOUNTER (OUTPATIENT)
Dept: OTHER | Facility: HOSPITAL | Age: 50
Discharge: HOME | End: 2025-04-11

## 2025-04-11 DIAGNOSIS — N20.1 URETEROLITHIASIS: Primary | ICD-10-CM

## 2025-04-11 DIAGNOSIS — N20.1 URETEROLITHIASIS: ICD-10-CM

## 2025-04-11 NOTE — TELEPHONE ENCOUNTER
"Call made to pt to triage; pt states he is feeling way better and feels \"great\" and has been hydrating and has medication from ER; RN states imaging report shows 4mm non-obstructing stone with mild hydro; RN states for the size of the stone can do a trial of passage if wishes to see if he passes the stone on his own in a month; pt states he would like to do that; RN states if passes stone to let us know; RN states to watch for s/s infection/blockage (n/v, fever, excessive pain) and states if experiences more symptoms before appt to try and get in with PCP, UC, or go to ER; pt is agreeable and understanding with no further questions; pt transferred to scheduling; potential imaging prior; message sent to MD.  "

## 2025-04-14 ENCOUNTER — TELEPHONE (OUTPATIENT)
Dept: UROLOGY | Age: 50
End: 2025-04-14
Payer: COMMERCIAL

## 2025-04-14 NOTE — TELEPHONE ENCOUNTER
Call made to pt to inform that if has not passed stone before 5/15 to get a KUB a week prior to appt; no ans; LVM; if calls back can inform pt that he will need to get get XR week prior to appt if has not passed stone no appt needed at a [facility] location or if need to send order to [facility] can do that as well.

## 2025-04-15 NOTE — TELEPHONE ENCOUNTER
Patient called back, he has passed the stone and is not having any urinary concerns at this time.

## 2025-04-21 ENCOUNTER — TELEPHONE (OUTPATIENT)
Dept: UROLOGY | Age: 50
End: 2025-04-21
Payer: COMMERCIAL

## 2025-04-21 NOTE — TELEPHONE ENCOUNTER
Any opening that works with his schedule in mid May, around when he was scheduled with Dr. Cabrera, should be fine.

## 2025-05-19 ENCOUNTER — TELEPHONE (OUTPATIENT)
Dept: UROLOGY | Age: 50
End: 2025-05-19
Payer: COMMERCIAL

## 2025-05-23 NOTE — TELEPHONE ENCOUNTER
3RD CALL TO PT TO [facility] FROM 5/15 W/[facility]/PT WAS REFERRED BY [facility]/ANYTHING ELSE TO DO??

## 2025-05-23 NOTE — TELEPHONE ENCOUNTER
Nothing else on your end Christopher Cruz, would you mind to send this patient a letter?  Thank you.